# Patient Record
(demographics unavailable — no encounter records)

---

## 2025-04-28 NOTE — PHYSICAL EXAM
[General Appearance - Well Developed] : well developed [General Appearance - Well Nourished] : well nourished [Elongated Uvula] : elongated uvula [III] : III [Heart Sounds] : normal S1 and S2 [Murmurs] : no murmurs [] : no respiratory distress [Auscultation Breath Sounds / Voice Sounds] : lungs were clear to auscultation bilaterally [No Focal Deficits] : no focal deficits [Oriented To Time, Place, And Person] : oriented to person, place, and time [Memory Recent] : recent memory was not impaired [FreeTextEntry1] : no edema

## 2025-04-28 NOTE — ASSESSMENT
[FreeTextEntry1] : Assessment:  Severe Obstructive Sleep Apnea (G47.33): Well-controlled on CPAP with AHI 3.1, but suboptimal usage (4 hours/night) and persistent daytime sleepiness ([place] 12). Likely contributing to morning fatigue. Chronic Non-Productive Cough: Likely multifactorial. Differential includes post-nasal drip, gastroesophageal reflux disease (GERD), or possible early morning airway irritation related to CPAP use. No evidence of infection, malignancy, or interstitial lung disease based on history and exam.  Plan:  Obstructive Sleep Apnea: Continue CPAP with full-face mask, pressure 5-20 cm H2O. Educate patient on importance of consistent CPAP use for 6 hours/night to optimize symptom relief.  Chronic Cough: if x ray normal -  will Trial empiric therapy for post-nasal drip with intranasal fluticasone 50 mcg, 2 sprays per nostril daily for 4 weeks. Trial empiric therapy for possible GERD with omeprazole 20 mg daily for 4 weeks. Instruct patient to maintain adequate humidification with CPAP to reduce airway irritation.  chest X-ray ordered and referral to pulmonology for further evaluation if cough persists (e.g., spirometry, CT chest if indicated).

## 2025-04-28 NOTE — HISTORY OF PRESENT ILLNESS
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   symptoms prior to cpap - Patient is sleepy with [place] sleepiness score of 12.  Patient has some snoring, does not have any witnessed apneas.  Patient's bedtime is around 10.30 PM wakes up in the morning around 6 AM.   He feels tired when he wakes up.  Patient drinks 1 cup of coffee during the daytime. Patient does not have any headaches or nocturia. He is not sleepy while driving. STOPBANG score - 5  sleep study showed severe sleep apnea with ahi of 39  symptoms on the cpap - Patient is sleepy with [place] sleepiness score of 12.  Patient does not snore with cpap.  Patient's bedtime is around 10.30 PM wakes up in the morning around 6 AM.   He feels tired when he wakes up.  Patient drinks 1 cup of coffee during the daytime. Patient does not have any headaches or nocturia. He is not sleepy while driving.  downlaod data ahi 3.1 pressure 5-20 cm usage 4 hrs dme [facility] uses fullface mask.  patient has chronic cough, cough is early in the morning for an hour after that it subsides. Non productive, no weight loss, no fever, no shortness of breath.